# Patient Record
Sex: FEMALE | Race: WHITE | Employment: OTHER | URBAN - METROPOLITAN AREA
[De-identification: names, ages, dates, MRNs, and addresses within clinical notes are randomized per-mention and may not be internally consistent; named-entity substitution may affect disease eponyms.]

---

## 2017-04-26 ENCOUNTER — HOSPITAL ENCOUNTER (OUTPATIENT)
Dept: MAMMOGRAPHY | Age: 77
Discharge: HOME | End: 2017-04-26
Attending: SPECIALIST
Payer: MEDICARE

## 2017-04-26 DIAGNOSIS — Z12.31 VISIT FOR SCREENING MAMMOGRAM: ICD-10-CM

## 2017-04-26 PROCEDURE — 77067 SCR MAMMO BI INCL CAD: CPT

## 2018-04-30 ENCOUNTER — HOSPITAL ENCOUNTER (OUTPATIENT)
Dept: MAMMOGRAPHY | Age: 78
Discharge: HOME | End: 2018-04-30
Attending: SPECIALIST
Payer: MEDICARE

## 2018-04-30 DIAGNOSIS — Z12.31 VISIT FOR SCREENING MAMMOGRAM: ICD-10-CM

## 2018-04-30 PROCEDURE — 77063 BREAST TOMOSYNTHESIS BI: CPT

## 2019-05-01 ENCOUNTER — HOSPITAL ENCOUNTER (OUTPATIENT)
Dept: MAMMOGRAPHY | Age: 79
Discharge: HOME | End: 2019-05-01
Attending: OBSTETRICS & GYNECOLOGY
Payer: MEDICARE

## 2019-05-01 DIAGNOSIS — Z12.39 BREAST SCREENING, UNSPECIFIED: ICD-10-CM

## 2019-05-01 PROCEDURE — 77063 BREAST TOMOSYNTHESIS BI: CPT

## 2020-02-14 RX ORDER — CLOPIDOGREL BISULFATE 75 MG/1
75 TABLET ORAL DAILY
COMMUNITY
End: 2021-11-14

## 2020-02-14 RX ORDER — AMLODIPINE BESYLATE 5 MG/1
5 TABLET ORAL
COMMUNITY

## 2020-02-14 RX ORDER — ATENOLOL AND CHLORTHALIDONE TABLET 50; 25 MG/1; MG/1
1 TABLET ORAL
COMMUNITY

## 2020-02-14 RX ORDER — CHOLECALCIFEROL (VITAMIN D3) 125 MCG
400 CAPSULE ORAL DAILY
COMMUNITY

## 2020-02-14 RX ORDER — ROSUVASTATIN CALCIUM 5 MG/1
5 TABLET, COATED ORAL
COMMUNITY

## 2020-02-14 RX ORDER — ZOLPIDEM TARTRATE 5 MG/1
5 TABLET ORAL
COMMUNITY

## 2020-02-14 RX ORDER — PANTOPRAZOLE SODIUM 40 MG/1
40 TABLET, DELAYED RELEASE ORAL
COMMUNITY

## 2020-02-14 RX ORDER — CHOLECALCIFEROL (VITAMIN D3) 125 MCG
1 CAPSULE ORAL DAILY
COMMUNITY

## 2020-02-14 RX ORDER — CHOLECALCIFEROL (VITAMIN D3) 125 MCG
1 CAPSULE ORAL AS NEEDED
COMMUNITY

## 2020-02-14 RX ORDER — BISMUTH SUBSALICYLATE 262 MG
2 TABLET,CHEWABLE ORAL DAILY
COMMUNITY

## 2020-02-14 NOTE — PERIOP NOTES
[address] , [address] [facility]   MAIN OR                                  [phone]   MAIN PRE OP                          [phone]                                                                                AMBULATORY PRE OP          [phone]  PRE-ADMISSION TESTING    [phone]     Surgery Date:   Thursday, February 20, 2020      Is surgery arrival time given by surgeon? NO  If NO, [facility] staff will call you between 3 and 7pm the day before your surgery with your arrival time. (If your surgery is on a Monday, we will call you the Friday before.)    Call [phone] after 7pm Monday-Friday if you did not receive this call. INSTRUCTIONS BEFORE YOUR SURGERY   When You  Arrive Arrive at the [address] [facility] on the day of your surgery  Have your insurance card, photo ID, and any copayment (if needed)   Food   and   Drink NO food or drink after midnight the night before surgery    This means NO water, gum, mints, coffee, juice, etc.  No alcohol (beer, wine, liquor) 24 hours before and after surgery   Medications to   TAKE   Morning of Surgery MEDICATIONS TO TAKE THE MORNING OF SURGERY WITH A SIP OF WATER:    Amlodipine, Atenolol-Chlorthalidone   Medications  To  STOP      7 days before surgery  Non-Steroidal anti-inflammatory Drugs (NSAID's): for example, Ibuprofen (Advil, Motrin), Naproxen (Aleve)   Aspirin, if taking for pain    Herbal supplements, vitamins, and fish oil  (Pain medications not listed above, including Tylenol may be taken)   Blood  Thinners  Not Applicable   Bathing Clothing  Jewelry  Valuables      If you shower the morning of surgery, please do not apply anything to your skin (lotions, powders, deodorant, or makeup, especially mascara)   Follow Chlorhexidine Care Fusion body wash instructions provided to you during PAT appointment. Begin 3 days prior to surgery.    Do not shave or trim anywhere 24 hours before surgery   Wear your hair loose or down; no pony-tails, buns, or metal hair clips   Wear loose, comfortable, clean clothes   Wear glasses instead of contacts   Leave money, valuables, and jewelry, including body piercings, at home   Going Home - or Spending the Night  SAME-DAY SURGERY: You must have a responsible adult drive you home and stay with you 24 hours after surgery   ADMITS: If your doctor is keeping you in the hospital after surgery, leave personal belongings/luggage in your car until you have a hospital room number. Hospital discharge time is 12 noon  Drivers must be here before 12 noon unless you are told differently   Special Instructions Free  Parking at [address] Monday-Friday 7a-5p. Follow all instructions so your surgery wont be cancelled. Please, be on time. If a situation occurs and you are delayed the day of surgery, call [phone] or [phone]. If your physical condition changes (like a fever, cold, flu, etc.) call your surgeon. Home medication(s) reviewed and verified verbally with patient during PAT phone interview. The patient was contacted  via phone. The patient verbalizes understanding of all instructions and does not  need reinforcement.

## 2020-02-19 ENCOUNTER — ANESTHESIA EVENT (OUTPATIENT)
Dept: SURGERY | Age: 80
End: 2020-02-19
Payer: MEDICARE

## 2020-02-20 ENCOUNTER — HOSPITAL ENCOUNTER (OUTPATIENT)
Dept: GENERAL RADIOLOGY | Age: 80
Discharge: HOME | End: 2020-02-20
Attending: PODIATRIST

## 2020-02-20 ENCOUNTER — HOSPITAL ENCOUNTER (OUTPATIENT)
Age: 80
Setting detail: OUTPATIENT SURGERY
Discharge: HOME | End: 2020-02-20
Attending: PODIATRIST | Admitting: PODIATRIST
Payer: MEDICARE

## 2020-02-20 ENCOUNTER — ANESTHESIA (OUTPATIENT)
Dept: SURGERY | Age: 80
End: 2020-02-20
Payer: MEDICARE

## 2020-02-20 VITALS
HEART RATE: 65 BPM | RESPIRATION RATE: 16 BRPM | WEIGHT: 184 LBS | BODY MASS INDEX: 29.57 KG/M2 | TEMPERATURE: 97.4 F | SYSTOLIC BLOOD PRESSURE: 141 MMHG | DIASTOLIC BLOOD PRESSURE: 69 MMHG | OXYGEN SATURATION: 94 % | HEIGHT: 66 IN

## 2020-02-20 PROBLEM — M20.11 HALLUX VALGUS (ACQUIRED), RIGHT FOOT: Status: ACTIVE | Noted: 2020-02-20

## 2020-02-20 PROBLEM — M20.41 HAMMER TOE OF SECOND TOE OF RIGHT FOOT: Status: ACTIVE | Noted: 2020-02-20

## 2020-02-20 PROBLEM — M62.40 CONTRACTURE, TENDON SHEATH: Status: ACTIVE | Noted: 2020-02-20

## 2020-02-20 PROCEDURE — 77030018836 HC SOL IRR NACL ICUM -A: Performed by: PODIATRIST

## 2020-02-20 PROCEDURE — 77030000032 HC CUF TRNQT ZIMM -B: Performed by: PODIATRIST

## 2020-02-20 PROCEDURE — 77030021305 HC BLD TEAR RASP BRSM -B: Performed by: PODIATRIST

## 2020-02-20 PROCEDURE — 76210000021 HC REC RM PH II 0.5 TO 1 HR: Performed by: PODIATRIST

## 2020-02-20 PROCEDURE — 77030020268 HC MISC GENERAL SUPPLY: Performed by: PODIATRIST

## 2020-02-20 PROCEDURE — 77030040922 HC BLNKT HYPOTHRM STRY -A

## 2020-02-20 PROCEDURE — 77030003748: Performed by: PODIATRIST

## 2020-02-20 PROCEDURE — 74011250636 HC RX REV CODE- 250/636: Performed by: ANESTHESIOLOGY

## 2020-02-20 PROCEDURE — 77030002933 HC SUT MCRYL J&J -A: Performed by: PODIATRIST

## 2020-02-20 PROCEDURE — 77030040361 HC SLV COMPR DVT MDII -B

## 2020-02-20 PROCEDURE — 76010000131 HC OR TIME 2 TO 2.5 HR: Performed by: PODIATRIST

## 2020-02-20 PROCEDURE — 77030002916 HC SUT ETHLN J&J -A: Performed by: PODIATRIST

## 2020-02-20 PROCEDURE — 77030031139 HC SUT VCRL2 J&J -A: Performed by: PODIATRIST

## 2020-02-20 PROCEDURE — 74011000250 HC RX REV CODE- 250: Performed by: PODIATRIST

## 2020-02-20 PROCEDURE — C1769 GUIDE WIRE: HCPCS | Performed by: PODIATRIST

## 2020-02-20 PROCEDURE — 74011250636 HC RX REV CODE- 250/636: Performed by: PODIATRIST

## 2020-02-20 PROCEDURE — 77030006773 HC BLD SAW OSC BRSM -A: Performed by: PODIATRIST

## 2020-02-20 PROCEDURE — 77030002986 HC SUT PROL J&J -A: Performed by: PODIATRIST

## 2020-02-20 PROCEDURE — 76060000035 HC ANESTHESIA 2 TO 2.5 HR: Performed by: PODIATRIST

## 2020-02-20 PROCEDURE — C1713 ANCHOR/SCREW BN/BN,TIS/BN: HCPCS | Performed by: PODIATRIST

## 2020-02-20 PROCEDURE — 77030020269 HC MISC IMPL: Performed by: PODIATRIST

## 2020-02-20 PROCEDURE — 76210000006 HC OR PH I REC 0.5 TO 1 HR: Performed by: PODIATRIST

## 2020-02-20 DEVICE — SCREW BNE L26MM DIA2.5MM MIC FT ANK TI SELF DRL ST CANN: Type: IMPLANTABLE DEVICE | Site: FOOT | Status: FUNCTIONAL

## 2020-02-20 DEVICE — SCREW BNE L30MM DIA2.5MM MIC FT ANK TI SELF DRL ST CANN: Type: IMPLANTABLE DEVICE | Site: FOOT | Status: FUNCTIONAL

## 2020-02-20 RX ORDER — BUPIVACAINE HYDROCHLORIDE 5 MG/ML
INJECTION, SOLUTION EPIDURAL; INTRACAUDAL AS NEEDED
Status: DISCONTINUED | OUTPATIENT
Start: 2020-02-20 | End: 2020-02-20 | Stop reason: HOSPADM

## 2020-02-20 RX ORDER — MIDAZOLAM HYDROCHLORIDE 1 MG/ML
INJECTION, SOLUTION INTRAMUSCULAR; INTRAVENOUS AS NEEDED
Status: DISCONTINUED | OUTPATIENT
Start: 2020-02-20 | End: 2020-02-20 | Stop reason: HOSPADM

## 2020-02-20 RX ORDER — SODIUM CHLORIDE 0.9 % (FLUSH) 0.9 %
5-40 SYRINGE (ML) INJECTION EVERY 8 HOURS
Status: DISCONTINUED | OUTPATIENT
Start: 2020-02-20 | End: 2020-02-20 | Stop reason: HOSPADM

## 2020-02-20 RX ORDER — HYDROMORPHONE HYDROCHLORIDE 1 MG/ML
.25-1 INJECTION, SOLUTION INTRAMUSCULAR; INTRAVENOUS; SUBCUTANEOUS
Status: DISCONTINUED | OUTPATIENT
Start: 2020-02-20 | End: 2020-02-20 | Stop reason: HOSPADM

## 2020-02-20 RX ORDER — SODIUM CHLORIDE, SODIUM LACTATE, POTASSIUM CHLORIDE, CALCIUM CHLORIDE 600; 310; 30; 20 MG/100ML; MG/100ML; MG/100ML; MG/100ML
125 INJECTION, SOLUTION INTRAVENOUS CONTINUOUS
Status: DISCONTINUED | OUTPATIENT
Start: 2020-02-20 | End: 2020-02-20 | Stop reason: HOSPADM

## 2020-02-20 RX ORDER — LIDOCAINE HYDROCHLORIDE 10 MG/ML
INJECTION INFILTRATION; PERINEURAL AS NEEDED
Status: DISCONTINUED | OUTPATIENT
Start: 2020-02-20 | End: 2020-02-20 | Stop reason: HOSPADM

## 2020-02-20 RX ORDER — SODIUM CHLORIDE 0.9 % (FLUSH) 0.9 %
5-40 SYRINGE (ML) INJECTION AS NEEDED
Status: DISCONTINUED | OUTPATIENT
Start: 2020-02-20 | End: 2020-02-20 | Stop reason: HOSPADM

## 2020-02-20 RX ORDER — FENTANYL CITRATE 50 UG/ML
INJECTION, SOLUTION INTRAMUSCULAR; INTRAVENOUS AS NEEDED
Status: DISCONTINUED | OUTPATIENT
Start: 2020-02-20 | End: 2020-02-20 | Stop reason: HOSPADM

## 2020-02-20 RX ORDER — PROPOFOL 10 MG/ML
INJECTION, EMULSION INTRAVENOUS AS NEEDED
Status: DISCONTINUED | OUTPATIENT
Start: 2020-02-20 | End: 2020-02-20 | Stop reason: HOSPADM

## 2020-02-20 RX ORDER — PROPOFOL 10 MG/ML
INJECTION, EMULSION INTRAVENOUS
Status: DISCONTINUED | OUTPATIENT
Start: 2020-02-20 | End: 2020-02-20 | Stop reason: HOSPADM

## 2020-02-20 RX ORDER — SODIUM CHLORIDE, SODIUM LACTATE, POTASSIUM CHLORIDE, CALCIUM CHLORIDE 600; 310; 30; 20 MG/100ML; MG/100ML; MG/100ML; MG/100ML
100 INJECTION, SOLUTION INTRAVENOUS CONTINUOUS
Status: DISCONTINUED | OUTPATIENT
Start: 2020-02-20 | End: 2020-02-20 | Stop reason: HOSPADM

## 2020-02-20 RX ADMIN — FENTANYL CITRATE 50 MCG: 50 INJECTION, SOLUTION INTRAMUSCULAR; INTRAVENOUS at 07:43

## 2020-02-20 RX ADMIN — PROPOFOL 20 MG: 10 INJECTION, EMULSION INTRAVENOUS at 09:59

## 2020-02-20 RX ADMIN — FENTANYL CITRATE 25 MCG: 50 INJECTION, SOLUTION INTRAMUSCULAR; INTRAVENOUS at 09:34

## 2020-02-20 RX ADMIN — FENTANYL CITRATE 25 MCG: 50 INJECTION, SOLUTION INTRAMUSCULAR; INTRAVENOUS at 08:25

## 2020-02-20 RX ADMIN — WATER 2 G: 1 INJECTION INTRAMUSCULAR; INTRAVENOUS; SUBCUTANEOUS at 07:53

## 2020-02-20 RX ADMIN — MIDAZOLAM 2 MG: 1 INJECTION INTRAMUSCULAR; INTRAVENOUS at 07:43

## 2020-02-20 RX ADMIN — PROPOFOL 20 MG: 10 INJECTION, EMULSION INTRAVENOUS at 08:51

## 2020-02-20 RX ADMIN — FENTANYL CITRATE 25 MCG: 50 INJECTION, SOLUTION INTRAMUSCULAR; INTRAVENOUS at 08:13

## 2020-02-20 RX ADMIN — PROPOFOL 50 MG: 10 INJECTION, EMULSION INTRAVENOUS at 07:47

## 2020-02-20 RX ADMIN — FENTANYL CITRATE 25 MCG: 50 INJECTION, SOLUTION INTRAMUSCULAR; INTRAVENOUS at 08:47

## 2020-02-20 RX ADMIN — PROPOFOL 20 MG: 10 INJECTION, EMULSION INTRAVENOUS at 10:05

## 2020-02-20 RX ADMIN — PROPOFOL 50 MCG/KG/MIN: 10 INJECTION, EMULSION INTRAVENOUS at 07:48

## 2020-02-20 RX ADMIN — PROPOFOL 20 MG: 10 INJECTION, EMULSION INTRAVENOUS at 08:03

## 2020-02-20 RX ADMIN — SODIUM CHLORIDE, POTASSIUM CHLORIDE, SODIUM LACTATE AND CALCIUM CHLORIDE 125 ML/HR: 600; 310; 30; 20 INJECTION, SOLUTION INTRAVENOUS at 07:00

## 2020-02-20 RX ADMIN — FENTANYL CITRATE 25 MCG: 50 INJECTION, SOLUTION INTRAMUSCULAR; INTRAVENOUS at 08:59

## 2020-02-20 RX ADMIN — PROPOFOL 20 MG: 10 INJECTION, EMULSION INTRAVENOUS at 10:07

## 2020-02-20 RX ADMIN — FENTANYL CITRATE 25 MCG: 50 INJECTION, SOLUTION INTRAMUSCULAR; INTRAVENOUS at 09:19

## 2020-02-20 NOTE — ANESTHESIA PREPROCEDURE EVALUATION
Relevant Problems   No relevant active problems       Anesthetic History   No history of anesthetic complications            Review of Systems / Medical History  Patient summary reviewed, nursing notes reviewed and pertinent labs reviewed    Pulmonary  Within defined limits                 Neuro/Psych   Within defined limits      TIA     Cardiovascular  Within defined limits                     GI/Hepatic/Renal  Within defined limits   GERD           Endo/Other  Within defined limits      Arthritis     Other Findings              Physical Exam    Airway  Mallampati: II  TM Distance: 4 - 6 cm  Neck ROM: normal range of motion   Mouth opening: Normal     Cardiovascular    Rhythm: regular  Rate: normal         Dental  No notable dental hx       Pulmonary  Breath sounds clear to auscultation               Abdominal         Other Findings            Anesthetic Plan    ASA: 3  Anesthesia type: MAC            Anesthetic plan and risks discussed with: Patient

## 2020-02-20 NOTE — DISCHARGE INSTRUCTIONS
Post-Operative Instructions:    Patient Name: Juan A Llanes  Patient MRN: [id number]  : 1940  Discharged Date: 2020      MEDICATIONS:   -If you experience nausea, take anti-nausea medication   -Take pain medication regularly for first 48 hours. Then take as needed for pain.     -Often anti-nausea medication helps relieve pain due to it's mild sedative effect.   -Constipation can be a common side effect when taking narcotic pain medications, take precautions to avoid this:    -Drink plenty of fluids    -Eat plenty of fiber    -Avoid caffeine and dairy products    -Take stool softener if needed (Colace/Dulcolax)    DIET:   -Eat light foods for first meal today (ie: dry cereal/toast/crackers, clear fluids). -If tolerated first meal, then can eat normally at second meal.    ACTIVITY:   -Relative BED REST for first 72 hours (only getting to bathroom, bedroom, kitchen)   -Keep surgical shoe/boot on at all times (even when sleeping)   -Elevate surgical site at all times (at least 6 inches above hip level)   -Apply ice pack to just above surgical site (NOT directly on the site), 10 mins on and 20 mins off for the first 72 hours.   -Weight-bearing: PWB with full weight on right heel, surgical shoe, and patient has a walker. DRESSINGS/SHOWER:   -Keep dressing clean, dry, and intact at all times.   -Reinforce dressing as needed, but DO NOT remove dressing!! EMERGENCY:   -Call office ([phone]) or on all pager after hours ([phone]) if. ..    -bandages become wet or heavy drainage/bleeding noted    -medications are not helping your pain    -you injure or bump the surgical site    -you have fever over 101.5 degrees    FOLLOW-UP:   -Make sure you follow-up with your doctor within 1 week of surgery.    -Call office ([phone]) if you need to schedule appointment           Patients signature:  Date: 2020  Discharging Nurse:    Physician: Lilibeth Nelson., DPM            DISCHARGE SUMMARY from your Nurse    The following personal items collected during your admission are returned to you:   Dental Appliance: Dental Appliances: None  Vision: Visual Aid: Glasses  Hearing Aid:    Jewelry: Jewelry: None  Clothing: Clothing: Pants, Shirt, Socks, Sweater, Undergarments, Footwear(placed in locker)  Other Valuables: Other Valuables: None  Valuables sent to safe:      PATIENT INSTRUCTIONS:    After general anesthesia or intravenous sedation, for 24 hours or while taking prescription Narcotics:  · Limit your activities  · Do not drive and operate hazardous machinery  · Do not make important personal or business decisions  · Do  not drink alcoholic beverages  · If you have not urinated within 8 hours after discharge, please contact your surgeon on call. Report the following to your surgeon:  · Excessive pain, swelling, redness or odor of or around the surgical area  · Temperature over 100.5  · Nausea and vomiting lasting longer than 4 hours or if unable to take medications  · Any signs of decreased circulation or nerve impairment to extremity: change in color, persistent  numbness, tingling, coldness or increase pain  · Any questions    COUGH AND DEEP BREATHE    Breathing deep and coughing are very important exercises to do after surgery. Deep breathing and coughing open the little air tubes and air sacks in your lungs. You take deep breaths every day. You may not even notice - it is just something you do when you sigh or yawn. It is a natural exercise you do to keep these air passages open. After surgery, take deep breaths and cough, on purpose. Coughing and deep breathing help prevent bronchitis and pneumonia after surgery. If you had chest or belly surgery, use a pillow as a \"hug buddy\" and hold it tightly to your chest or belly when you cough. DIRECTIONS:  · Take 10 to 15 slow deep breaths every hour while awake. · Breathe in deeply, and hold it for 2 seconds.   · Exhale slowly through puckered lips, like blowing up a balloon. · After every 4th or 5th deep breath, hug your pillow to your chest or belly and give a hard, deep cough. Yes, it will probably hurt. But doing this exercise is very important part of healing after surgery. Take your pain medicine to help you do this exercise without too much pain. IF YOU HAVE BEEN DIAGNOSED WITH SLEEP APNEA, PLEASE USE YOUR SLEEP APNEA DEVICE OR CPAP MACHINE WHEN YOU INTEND TO NAP AFTER TAKING PAIN MEDICATION. Ankle Pumps    Ankle pumps increase the circulation of oxygenated blood to your lower extremities and decrease your risk for circulation problems such as blood clots. They also stretch the muscles, tendons and ligaments in your foot and ankle, and prevent joint contracture in the ankle and foot, especially after surgeries on the legs. It is important to do ankle pump exercises regularly after surgery because immobility increases your risk for developing a blood clot. Your doctor may also have you take an Aspirin for the next few days as well. If your doctor did not ask you to take an Aspirin, consult with him before starting Aspirin therapy on your own. Slowly point your foot forward, feeling the muscles on the top of your lower leg stretch, and hold this position for 5 seconds. Next, pull your foot back toward you as far as possible, stretching the calf muscles, and hold that position for 5 seconds. Repeat with the other foot. Perform 10 repetitions every hour while awake for both ankles if possible (down and then up with the foot once is one repetition). You should feel gentle stretching of the muscles in your lower leg when doing this exercise. If you feel pain, or your range of motion is limited, don't  Push too hard. Only go the limit your joint and muscles will let you go.   If you have increasing pain, progressively worsening leg warmth or swelling, STOP the exercise and call your doctor. Below is information about the medications your doctor is prescribing after your visit:    Other information in your discharge envelope:  []     PRESCRIPTIONS  []     SCHOOL/WORK NOTE  [x]     INFECTION PREVENTION  []     Yamelien 37  []     REGIONAL NERVE BLOCK ON QUE PAMPHLET   []     EXPAREL  []     HANDICAP APPLICATION         These are general instructions for a healthy lifestyle:    *  Please give a list of your current medications to your Primary Care Provider. *  Please update this list whenever your medications are discontinued, doses are      changed, or new medications (including over-the-counter products) are added. *  Please carry medication information at all times in case of emergency situations. About Smoking  No smoking / No tobacco products / Avoid exposure to second hand smoke    Surgeon General's Warning:  Quitting smoking now greatly reduces serious risk to your health. Obesity, smoking, and sedentary lifestyle greatly increases your risk for illness and disease. A healthy diet, regular physical exercise & weight monitoring are important for maintaining a healthy lifestyle. Congestive Heart Failure  You may be retaining fluid if you have a history of heart failure or if you experience any of the following symptoms:  Weight gain of 3 pounds or more overnight or 5 pounds in a week, increased swelling in our hands or feet or shortness of breath while lying flat in bed. Please call your doctor as soon as you notice any of these symptoms; do not wait until your next office visit. Recognize signs and symptoms of STROKE:  F - face looks uneven  A - arms unable to move or move even  S - speech slurred or non-existent  T - time-call 911 as soon as signs and symptoms begin-DO NOT go         Back to bed or wait to see if you get better-TIME IS BRAIN. Warning signs of HEART ATTACK  Call 911 if you have these symptoms    · Chest discomfort.   Most heart attacks involve discomfort in the center of the chest that lasts more than a few minutes, or that goes away and comes back. It can feel like uncomfortable pressure, squeezing, fullness, or pain. · Discomfort in other areas of the upper body. Symptoms can include pain or discomfort in one or both        Arms, the back, neck, jaw, or stomach. ·  Shortness of breath with or without chest discomfort. · Other signs may include breaking out in a cold sweat, nausea, or lightheadedness    Don't wait more than five minutes to call 911 - MINUTES MATTER! Fast action can save your life. Calling 911 is almost always the fastest way to get lifesaving treatment. Emergency Medical Services staff can begin treatment when they arrive - up to an hour sooner than if someone gets to the hospital by car. [facility] MEDICATION AND SIDE EFFECT GUIDE    The [facility] MEDICATION AND SIDE EFFECT GUIDE was provided to the PATIENT AND CARE PROVIDER.   Information provided includes instruction about drug purpose and common side effects for the following medications:    · Take prescriptions exactly as directed by Dr. Michoacano Ruggiero

## 2020-02-20 NOTE — ANESTHESIA POSTPROCEDURE EVALUATION
Procedure(s):  HALLUX VALGUS CORRECTION FIRST METATARSAL POSSIBLE FIRST TOE OSTEOTOMY, SCREW FIXATION, SECOND HAMMERTOE CORRECTION WITH OSTEOTOMY, TENOTOMY AND CAPSULOTOMY OF SECOND METATARSAL PHALANGEAL JOINT RIGHT FOOT. No value filed. Anesthesia Post Evaluation      Multimodal analgesia: multimodal analgesia not used between 6 hours prior to anesthesia start to PACU discharge  Patient location during evaluation: PACU  Patient participation: complete - patient participated  Level of consciousness: sleepy but conscious, awake and responsive to verbal stimuli  Pain score: 0  Pain management: adequate  Airway patency: patent  Anesthetic complications: no  Cardiovascular status: hemodynamically stable and acceptable  Respiratory status: acceptable  Hydration status: acceptable  Comments: Patient seen and evaluated; no concerns. Post anesthesia nausea and vomiting:  none      Vitals Value Taken Time   /69 2/20/2020 10:35 AM   Temp 36.3 °C (97.4 °F) 2/20/2020 10:15 AM   Pulse 54 2/20/2020 10:38 AM   Resp 14 2/20/2020 10:38 AM   SpO2 100 % 2/20/2020 10:38 AM   Vitals shown include unvalidated device data.

## 2020-02-20 NOTE — BRIEF OP NOTE
BRIEF OPERATIVE NOTE    Date of Procedure: 2/20/2020   Preoperative Diagnosis: HALLUX VALGUS, SECOND HAMMERTOE, CONTRACTED EXTENSOR TENDON AND CAPSULE SECOND METATARSAL PHALANGEAL JOINT RIGHT FOOT  Postoperative Diagnosis:  HALLUX VALGUS, SECOND HAMMERTOE, EXTENSOR TENDON AND CAPSULE SECOND METATARSAL PHALANGEAL JOINT RIGHT FOOT    Procedure(s):  HALLUX VALGUS CORRECTION FIRST METATARSAL OSTEOTOMY, SCREW FIXATION, SECOND HAMMERTOE CORRECTION WITH OSTEOTOMY, TENOTOMY AND CAPSULOTOMY OF SECOND METATARSAL PHALANGEAL JOINT RIGHT FOOT  Surgeon(s) and Role:     * Myla Rees DPM - Primary         Surgical Assistant: NONE. Surgical Staff:  Circ-1: Honorio Blanco RN  Circ-Relief: Leon Cope RN  Scrub Tech-1: Ventura Kaye  Surg Asst-1: MayJacquelin  Event Time In Time Out   Incision Start 0040    Incision Close 1008      Anesthesia: MAC   Estimated Blood Loss: Less than 5 mL  Specimens: * No specimens in log *   Findings: Hypertrophic bone, digital contracture 2nd, right. Complications: None. Implants:   Implant Name Type Inv.  Item Serial No.  Lot No. LRB No. Used Action   CytRx   [id number]  [id number] Right 1 Implanted   SCR BNE COMP FT GERMAINE 2.5X26MM --  - SNA  SCR BNE COMP FT GERMAINE 2.5X26MM --  NA ARTHREX NA Right 1 Implanted   SCR BNE COMP FT GERMAINE 2.5X30MM --  - SNA  SCR BNE COMP FT GERMAINE 2.5X30MM --  NA ARTHREX NA Right 1 Implanted

## 2020-02-21 NOTE — OP NOTES
[facility]  OPERATIVE REPORT    Name:  Jovanni Rodas  MR#:  [id number]  :  1940  ACCOUNT #:  [de-identified]  DATE OF SERVICE:  2020    PREOPERATIVE DIAGNOSES:  1. 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 2.  Hammertoe deformity of the second digit, right foot. 3.  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 POSTOPERATIVE DIAGNOSES:  1. 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 2.  Hammertoe deformity of the second digit, right foot. 3.  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 PROCEDURE PERFORMED:  1. Hallux valgus correction with first metatarsal osteotomy and screw fixation, right foot. 2.  Hammertoe correction, second digit with osteotomy, right foot. 3.  Tenotomy and capsulotomy, second metatarsophalangeal joint, right foot. SURGEON:  Taras Chen DPM    ANESTHESIOLOGIST:  Dr. Kimani Mckeon. ASSISTANT:  None. ANESTHESIA:  MAC.    COMPLICATIONS:  *None. SPECIMENS REMOVED:  None. IMPLANTS:  Two 2.5-mm fully threaded Arthrex compression screws and also the Vivex amniotic soft tissue graft, 3 cm x 4 cm. ESTIMATED BLOOD LOSS:  Less than 5 mL. INDICATIONS:  The patient is a 45-year-old white female, who presented to the office with chronically painful bunion deformity, hammertoe deformity and contracted second metatarsophalangeal joint on the right foot. The patient had all treatment options reviewed with her from conservative to surgical.  The patient was made aware of all risks, benefits, alternatives and potential complications of surgical management including but not limited to need for additional surgery, failure of the procedure to achieve desired results, over or under correction, swelling, stiffness, scarring, numbness, nerve damage, delayed union, malunion or nonunion which was reviewed and that she would be a particular risk given her advanced age.   The patient was also made aware of delayed healing of the soft tissue, potential of infection of the soft tissue and/or the bone. The patient was made aware of potentially prolonged postoperative period as well as possible prolonged and painful postoperative period as well as a chronic pain syndrome. The patient had the consent reviewed, understood and signed, and elected for surgical management of this condition. PREPARATION AND PROCEDURE:  The patient was taken to the operating room, placed on the operating room table in supine position. The patient had a well-padded pneumatic midcalf tourniquet applied to the right calf. The patient had received 2 g of Ancef for preoperative prophylactic antibiotics. The patient had the right foot prepped and draped in normal sterile fashion. The pneumatic midcalf tourniquet was elevated to 300 mmHg and procedure began. The patient did receive 18 mL of 1% lidocaine infiltrated into the surgical site. Procedure #1 is hallux valgus correction with first metatarsal osteotomy and screw fixation. Attention was directed towards the medial aspect of the first metatarsophalangeal joint where a 1.5-cm incision was made along the course of a prominent first metatarsal head with palpable bursa and exostosis. The incision was deepened down to the level of the subcutaneous tissue, taking care to appropriately retract and ligate all vascular structures as necessary. Then, though both blunt and sharp dissection, the incision was deepened down to the level of the bursa which was identified and carefully resected in toto. Site was copiously flushed and irrigated. Dissection was then taken down to the level of the periosteum and joint capsule of the medial aspect of the first metatarsophalangeal joint. Then, a linear capsular and periosteal incision was made. The soft tissues were elevated with the use of a [place] elevator and then a #15 blade and the Brown-Blaise forceps.   There was noted be significant hypertrophic bone of the medial aspect of the first metatarsal.  This was resected in toto with the use of a rongeur, then smoothed with a reciprocating rasp. Site was copiously washed and irrigated. Next, using the Arthrex MIS platform, the side-cutting heidi from the Arthrex set was brought to the medial aspect of the juncture of the metatarsal just proximal to the medial aspect of the first metatarsal head. Under intraoperative C-arm guidance, following the technique guide from Arthrex strictly for the minimal incision surgery, the side-cutting heidi was utilized to perform a transverse osteotomy cut. Then, the capital fragment osteotomy was translated laterally following the reduction technique with a curved hemostat. Then, a 0.062 wire was utilized to maintain the fixation in a laterally positioned capital fragment osteotomy. Then following strict AO technique, two 2.5-mm fully-threaded Arthrex compression screws were brought across the osteotomy site from proximal medial to distal lateral.  Intraoperative C-arm confirmed excellent purchase of bone for the fixation with no extension to adjacent joint spaces. The capital fragment osteotomy was plantarflexed to compensate for forefoot varus. Site was copiously flushed and irrigated. There was noted to be adequate reduction without need for an Joseph osteotomy. PROCEDURE #2, hammertoe correction of the second digit on the right foot. Attention was directed towards the second toe of the right foot where there was noted to be a digital contracture at the proximal interphalangeal joint, dorsiflexion of the proximal phalanx and plantarflexion of the middle phalanx.   With the use of a [place] blade, a small stab incision was made at the medial aspect of the proximal phalanx and then a second incision was made to the medial aspect of the middle phalanx and through both blunt and sharp dissection, incision was deepened down to the level of subcutaneous tissue, taking care to appropriately retract and ligate all neurovascular structures as necessary. Then through both blunt and sharp dissection, the periosteum of the proximal phalanx was identified and then a periosteal incision was made at each incision with the use of a #15 blade. The periosteal tissue was elevated at each incision with the use of a [place] elevator. Then using the small 2 x 12-mm side-cutting heidi from the Arthrex set, two osteotomy cuts were made into the proximal and then the middle phalanx sequentially. There was found to be adequate reduction of the toe deformity with a residual contracture at the second metatarsophalangeal joint. Procedure #3 is tenotomy and capsulotomy of the second metatarsophalangeal joint. Attention was directed towards the dorsal aspect of the second metatarsophalangeal joint where a small stab incision was made. The extensor tendon was identified and transversely tenotomized. Then, the dissection was deepened down to the level of the joint capsule which then was released as well in a transverse fashion. The toe was completely reduced at this stage. Site was copiously flushed and irrigated. At the surgery sites, bone wax was applied to the osteotomy cuts at the sites of bleeding bone. Then, deep closure was obtained with the use of 3-0 Vicryl at the periosteal and capsular level. Then, the site was copiously flushed and irrigated at each surgery site. Then, the subcutaneous tissue was closed with the use of 3-0 Monocryl in interrupted simple suture fashion and the skin was closed with the use of 4-0 nylon in interrupted horizontal mattress suture fashion. The patient received 20 mL of 0.5% Marcaine for postoperative analgesia. The incisions were dressed with Xeroform, 4x4, Esdras and Ace bandage. The pneumatic ankle tourniquet had been deflated and noted that the capillary filling time was immediate to all digits of the right foot.   The patient will be followed up as an outpatient.       Colby Herrera [id number]  D:  02/20/2020 10:50  T:  02/20/2020 22:50  JOB #:  [id number]

## 2020-05-29 ENCOUNTER — HOSPITAL ENCOUNTER (OUTPATIENT)
Dept: MAMMOGRAPHY | Age: 80
Discharge: HOME | End: 2020-05-29
Attending: SPECIALIST
Payer: MEDICARE

## 2020-05-29 DIAGNOSIS — Z12.31 BREAST CANCER SCREENING BY MAMMOGRAM: ICD-10-CM

## 2020-05-29 PROCEDURE — 77067 SCR MAMMO BI INCL CAD: CPT

## 2021-05-03 ENCOUNTER — TRANSCRIBE ORDER (OUTPATIENT)
Dept: SCHEDULING | Age: 81
End: 2021-05-03

## 2021-05-03 DIAGNOSIS — Z12.31 SCREENING MAMMOGRAM FOR HIGH-RISK PATIENT: Primary | ICD-10-CM

## 2021-06-21 ENCOUNTER — HOSPITAL ENCOUNTER (OUTPATIENT)
Dept: MAMMOGRAPHY | Age: 81
Discharge: HOME | End: 2021-06-21
Attending: INTERNAL MEDICINE
Payer: MEDICARE

## 2021-06-21 DIAGNOSIS — Z12.31 SCREENING MAMMOGRAM FOR HIGH-RISK PATIENT: ICD-10-CM

## 2021-06-21 PROCEDURE — 77067 SCR MAMMO BI INCL CAD: CPT

## 2021-11-14 ENCOUNTER — HOSPITAL ENCOUNTER (EMERGENCY)
Age: 81
Discharge: HOME | End: 2021-11-14
Attending: FAMILY MEDICINE
Payer: MEDICARE

## 2021-11-14 VITALS
WEIGHT: 174 LBS | BODY MASS INDEX: 27.97 KG/M2 | DIASTOLIC BLOOD PRESSURE: 86 MMHG | HEIGHT: 66 IN | RESPIRATION RATE: 18 BRPM | TEMPERATURE: 97.8 F | OXYGEN SATURATION: 96 % | SYSTOLIC BLOOD PRESSURE: 184 MMHG | HEART RATE: 83 BPM

## 2021-11-14 DIAGNOSIS — M62.838 TRAPEZIUS MUSCLE SPASM: ICD-10-CM

## 2021-11-14 DIAGNOSIS — R04.0 EPISTAXIS: Primary | ICD-10-CM

## 2021-11-14 PROCEDURE — 99282 EMERGENCY DEPT VISIT SF MDM: CPT

## 2021-11-14 NOTE — ED TRIAGE NOTES
Patient reports nose bleed that lasted for 30 min today, reports woke her up has been having HA intermittent for past 3 months

## 2021-11-16 NOTE — ED PROVIDER NOTES
EMERGENCY DEPARTMENT HISTORY AND PHYSICAL EXAM      Date: 11/14/2021  Patient Name: Kiara Thorpe    History of Presenting Illness     Chief complaint: Bloody nose  History Provided By:     HPI: Kiara Thorpe, is an extremely pleasant 80 y.o. female presenting to the ED with a chief complaint of bloody nose. Patient endorses a bloody nose that she could not get to stop prior to arrival.  She is chronically anticoagulated on Xarelto. She has been taking her medications as prescribed. Upon arrival to the emergency department there has been no further bleeding. She complains only of tenderness in the muscles between her neck and left shoulder. She denies any other bleeding. No easy bruising. She states she otherwise feels well. No fevers, chills or rigors. There are no other complaints, changes, or physical findings at this time. PCP: Patrice Hunt MD    No current facility-administered medications on file prior to encounter. Current Outpatient Medications on File Prior to Encounter   Medication Sig Dispense Refill    rivaroxaban (Xarelto) 10 mg tablet Take 10 mg by mouth daily.  pantoprazole (PROTONIX) 40 mg tablet Take 40 mg by mouth nightly.  rosuvastatin (CRESTOR) 5 mg tablet Take 5 mg by mouth every Monday, Wednesday, Friday.  zolpidem (AMBIEN) 5 mg tablet Take 5 mg by mouth nightly as needed for Sleep.  atenoloL-chlorthalidone (TENORETIC) 50-25 mg per tablet Take 1 Tab by mouth every morning.  amLODIPine (NORVASC) 5 mg tablet Take 5 mg by mouth every morning.  co-enzyme Q-10 (CO Q-10) 100 mg capsule Take 400 mg by mouth daily.  cholecalciferol, vitamin D3, (VITAMIN D3) 50 mcg (2,000 unit) tab Take 1 Tab by mouth daily.  multivitamin (ONE A DAY) tablet Take 2 Tabs by mouth daily.  naproxen sodium (ALEVE) 220 mg cap Take 1 Tab by mouth as needed.          Past History     Past Medical History:  Past Medical History:   Diagnosis Date    Arthritis     Right Hand    Breast cancer ([facility] [address] 1989    br ca    Fractured elbow 1949    Left    GERD (gastroesophageal reflux disease) 2018    Stroke ([facility] [address] 2010    TIA        Past Surgical History:  Past Surgical History:   Procedure Laterality Date    HX BREAST LUMPECTOMY Right 1989    br ca    HX BUNIONECTOMY Left 2005    HX CHOLECYSTECTOMY  2010    HX CORONARY STENT PLACEMENT  2005    HX KNEE REPLACEMENT Left 2002    HX KNEE REPLACEMENT Right 2008    HX WISDOM TEETH EXTRACTION  teenager       Family History:  Family History   Problem Relation Age of Onset    Breast Cancer Maternal Grandmother         age unknown    Breast Cancer Paternal Grandmother         age unknown    Breast Cancer Maternal Aunt 44       Social History:  Social History     Tobacco Use    Smoking status: Never Smoker    Smokeless tobacco: Never Used   Substance Use Topics    Alcohol use: Not Currently    Drug use: Never       Allergies:  No Known Allergies      Review of Systems     Review of Systems   Constitutional: Negative for activity change, appetite change, chills, fatigue and fever. HENT: Negative for congestion and sore throat. Bloody nose   Eyes: Negative for photophobia and visual disturbance. Respiratory: Negative for cough, shortness of breath and wheezing. Cardiovascular: Negative for chest pain, palpitations and leg swelling. Gastrointestinal: Negative for abdominal pain, diarrhea, nausea and vomiting. Endocrine: Negative for cold intolerance and heat intolerance. Musculoskeletal: Negative for gait problem and joint swelling. Pain in muscles between neck and left shoulder   Skin: Negative for color change and rash. Neurological: Negative for dizziness and headaches. Physical Exam     Physical Exam  Constitutional:       Appearance: She is well-developed. HENT:      Head: Normocephalic and atraumatic.       Nose:      Comments: Dried blood in bilateral nares, no   bleeding Mouth/Throat:      Mouth: Mucous membranes are moist.      Pharynx: Oropharynx is clear. Eyes:      Conjunctiva/sclera: Conjunctivae normal.      Pupils: Pupils are equal, round, and reactive to light. Cardiovascular:      Rate and Rhythm: Normal rate and regular rhythm. Heart sounds: No murmur heard. Pulmonary:      Effort: No respiratory distress. Breath sounds: No stridor. No wheezing, rhonchi or rales. Abdominal:      General: There is no distension. Tenderness: There is no abdominal tenderness. There is no rebound. Musculoskeletal:      Cervical back: Normal range of motion and neck supple. Comments: Tenderness to palpation in the left trapezius muscle. No midline C-spine tenderness or stiffness   Skin:     General: Skin is warm and dry. Neurological:      General: No focal deficit present. Mental Status: She is alert and oriented to person, place, and time. Psychiatric:         Mood and Affect: Mood normal.         Behavior: Behavior normal.         Lab and Diagnostic Study Results     Labs -   No results found for this or any previous visit (from the past 12 hour(s)). Radiologic Studies -   @lastxrresult@  CT Results  (Last 48 hours)    None        CXR Results  (Last 48 hours)    None            Medical Decision Making   - I am the first provider for this patient. - I reviewed the vital signs, available nursing notes, past medical history, past surgical history, family history and social history. - Initial assessment performed. The patients presenting problems have been discussed, and they are in agreement with the care plan formulated and outlined with them. I have encouraged them to ask questions as they arise throughout their visit. Vital Signs-Reviewed the patient's vital signs. No data found. ED Course/ Provider Notes (Medical Decision Making):     Patient presented to the emergency department with a chief complaint of bloody nose.   On examination the patient is nontoxic and well-appearing. She is afebrile. Vitals were reviewed per above. Bleeding had stopped spontaneously in the emergency department she was monitored with no return of bleeding. We discussed supportive measures for her symptoms. Also noted to have tenderness of left trapezius muscle. Possible spasm. Discussed supportive measures for her likely trapezius muscle spasm. Dona Carpenter was given a thorough list of signs and symptoms that would warrant an immediate return to the emergency department. Otherwise Dona Carpenter will follow up with PCP. Procedures   Medical Decision Makingedical Decision Making  Performed by: Jaqueline Craft, DO  Procedures  None       Disposition   Disposition:     Home    All of the diagnostic tests were reviewed and questions answered. Diagnosis, care plan and treatment options were discussed. The patient understands the instructions and will follow up as directed. The patients results have been reviewed with them. They have been counseled regarding their diagnosis. The patient verbally convey understanding and agreement of the signs, symptoms, diagnosis, treatment and prognosis and additionally agrees to follow up as recommended with their PCP in 24 - 48 hours. They also agree with the care-plan and convey that all of their questions have been answered. I have also put together some discharge instructions for them that include: 1) educational information regarding their diagnosis, 2) how to care for their diagnosis at home, as well a 3) list of reasons why they would want to return to the ED prior to their follow-up appointment, should their condition change. DISCHARGE PLAN:    1. Cannot display discharge medications since this patient is not currently admitted.         2.   Follow-up Information     Follow up With Specialties Details Why Contact Gloria Tatum MD Internal Medicine   [address] [place] [address] [address] [facility]  [phone]              3.  Return to ED if worse       4. Discharge Medication List as of 11/14/2021  4:52 PM            Diagnosis     Clinical Impression:    1. Epistaxis    2. Trapezius muscle spasm        Attestations:    Dorina Arshad, DO    Please note that this dictation was completed with [employer], the computer voice recognition software. Quite often unanticipated grammatical, syntax, homophones, and other interpretive errors are inadvertently transcribed by the computer software. Please disregard these errors. Please excuse any errors that have escaped final proofreading. Thank you.

## 2022-03-18 PROBLEM — M20.11 HALLUX VALGUS (ACQUIRED), RIGHT FOOT: Status: ACTIVE | Noted: 2020-02-20

## 2022-03-19 PROBLEM — M62.40 CONTRACTURE, TENDON SHEATH: Status: ACTIVE | Noted: 2020-02-20

## 2022-03-19 PROBLEM — M20.41 HAMMER TOE OF SECOND TOE OF RIGHT FOOT: Status: ACTIVE | Noted: 2020-02-20

## 2022-05-26 ENCOUNTER — TRANSCRIBE ORDER (OUTPATIENT)
Dept: SCHEDULING | Age: 82
End: 2022-05-26

## 2022-05-26 DIAGNOSIS — Z12.31 VISIT FOR SCREENING MAMMOGRAM: Primary | ICD-10-CM

## 2022-06-22 ENCOUNTER — HOSPITAL ENCOUNTER (OUTPATIENT)
Dept: MAMMOGRAPHY | Age: 82
Discharge: HOME | End: 2022-06-22
Attending: SPECIALIST
Payer: MEDICARE

## 2022-06-22 DIAGNOSIS — Z12.31 VISIT FOR SCREENING MAMMOGRAM: ICD-10-CM

## 2022-06-22 PROCEDURE — 77067 SCR MAMMO BI INCL CAD: CPT

## 2023-05-31 ENCOUNTER — TRANSCRIBE ORDERS (OUTPATIENT)
Facility: HOSPITAL | Age: 83
End: 2023-05-31

## 2023-05-31 DIAGNOSIS — Z12.31 VISIT FOR SCREENING MAMMOGRAM: Primary | ICD-10-CM

## 2023-07-18 ENCOUNTER — HOSPITAL ENCOUNTER (OUTPATIENT)
Facility: HOSPITAL | Age: 83
Discharge: HOME | End: 2023-07-21
Payer: MEDICARE

## 2023-07-18 DIAGNOSIS — Z12.31 VISIT FOR SCREENING MAMMOGRAM: ICD-10-CM

## 2023-07-18 PROCEDURE — 77063 BREAST TOMOSYNTHESIS BI: CPT

## 2024-05-29 ENCOUNTER — TRANSCRIBE ORDERS (OUTPATIENT)
Dept: SCHEDULING | Age: 84
End: 2024-05-29

## 2024-05-29 DIAGNOSIS — Z12.31 VISIT FOR SCREENING MAMMOGRAM: Primary | ICD-10-CM

## 2024-07-24 ENCOUNTER — HOSPITAL ENCOUNTER (OUTPATIENT)
Facility: HOSPITAL | Age: 84
Discharge: HOME | End: 2024-07-27
Payer: MEDICARE

## 2024-07-24 VITALS — BODY MASS INDEX: 28.68 KG/M2 | HEIGHT: 64 IN | WEIGHT: 168 LBS

## 2024-07-24 DIAGNOSIS — Z12.31 VISIT FOR SCREENING MAMMOGRAM: ICD-10-CM

## 2024-07-24 PROCEDURE — 77063 BREAST TOMOSYNTHESIS BI: CPT

## 2025-07-28 ENCOUNTER — HOSPITAL ENCOUNTER (OUTPATIENT)
Facility: HOSPITAL | Age: 85
Discharge: HOME | End: 2025-07-31
Payer: MEDICARE

## 2025-07-28 VITALS — WEIGHT: 149 LBS | BODY MASS INDEX: 25.44 KG/M2 | HEIGHT: 64 IN

## 2025-07-28 DIAGNOSIS — Z12.31 VISIT FOR SCREENING MAMMOGRAM: ICD-10-CM

## 2025-07-28 PROCEDURE — 77063 BREAST TOMOSYNTHESIS BI: CPT

## (undated) DEVICE — SURGICAL PROCEDURE PACK BASIN MAJ SET CUST NO CAUT

## (undated) DEVICE — IRRIGATION TUBING

## (undated) DEVICE — RASP SURG W5.5XL11MM REPL RECIP SM TEAR CRSS CUT STRYKR FOR

## (undated) DEVICE — STERILE POLYISOPRENE POWDER-FREE SURGICAL GLOVES: Brand: PROTEXIS

## (undated) DEVICE — TUBING SUCT 10FR MAL ALUM SHFT FN CAP VENT UNIV CONN W/ OBT

## (undated) DEVICE — BANDAGE COMPR 9 FTX4 IN SMOOTH COMFORTABLE SYNTH ESMRK LF

## (undated) DEVICE — PACK,BASIC,SIRUS,V: Brand: MEDLINE

## (undated) DEVICE — DEVICE TRNSF SPIK STL 2008S] MICROTEK MEDICAL INC]

## (undated) DEVICE — ZIMMER® STERILE DISPOSABLE TOURNIQUET CUFF WITH PROTECTIVE SLEEVE AND PLC, DUAL PORT, SINGLE BLADDER, 18 IN. (46 CM)

## (undated) DEVICE — GUIDEWIRE ORTH DIA0.034IN W/ TRCR TIP LSR MRK DISP FOR MIC

## (undated) DEVICE — COVER LT HNDL PLAS RIG 1 PER PK

## (undated) DEVICE — STERILE POLYISOPRENE POWDER-FREE SURGICAL GLOVES WITH EMOLLIENT COATING: Brand: PROTEXIS

## (undated) DEVICE — SUTURE MCRYL SZ 4-0 L27IN ABSRB UD L19MM PS-2 1/2 CIR PRIM Y426H

## (undated) DEVICE — SUTURE VCRL SZ 4-0 L27IN ABSRB VLT L26MM SH 1/2 CIR J315H

## (undated) DEVICE — SYR 3ML LL TIP 1/10ML GRAD --

## (undated) DEVICE — DRAPE,EXTREMITY,89X128,STERILE: Brand: MEDLINE

## (undated) DEVICE — DRESSING,GAUZE,XEROFORM,CURAD,1"X8",ST: Brand: CURAD

## (undated) DEVICE — 1010 S-DRAPE TOWEL DRAPE 10/BX: Brand: STERI-DRAPE™

## (undated) DEVICE — NEEDLE HYPO 18GA L1.5IN PNK S STL HUB POLYPR SHLD REG BVL

## (undated) DEVICE — SUTURE MCRYL SZ 3-0 L27IN ABSRB UD L26MM SH 1/2 CIR Y416H

## (undated) DEVICE — TOWEL SURG W17XL27IN STD BLU COT NONFENESTRATED PREWASHED

## (undated) DEVICE — BANDAGE,GAUZE,CONFORMING,3"X75",STRL,LF: Brand: MEDLINE

## (undated) DEVICE — (D)PREP SKN CHLRAPRP APPL 26ML -- CONVERT TO ITEM 371833

## (undated) DEVICE — Device

## (undated) DEVICE — SUT ETHLN 4-0 18IN PS2 BLK --

## (undated) DEVICE — INTENDED FOR TISSUE SEPARATION, AND OTHER PROCEDURES THAT REQUIRE A SHARP SURGICAL BLADE TO PUNCTURE OR CUT.: Brand: BARD-PARKER ® CARBON RIB-BACK BLADES

## (undated) DEVICE — SOLUTION IV 1000ML 0.9% SOD CHL

## (undated) DEVICE — SUT PROL 4-0 18IN P3 BLU --

## (undated) DEVICE — SPONGE GZ W4XL4IN COT 12 PLY TYP VII WVN C FLD DSGN

## (undated) DEVICE — BLADE SAW W5.5XL25MM THK0.38MM CUT THK0.43MM REPL S STL SAG

## (undated) DEVICE — STRAP,POSITIONING,KNEE/BODY,FOAM,4X60": Brand: MEDLINE

## (undated) DEVICE — NEEDLE HYPO 25GA L1.5IN BVL ORIENTED ECLIPSE

## (undated) DEVICE — ROCKER SWITCH PENCIL BLADE ELECTRODE, HOLSTER: Brand: EDGE

## (undated) DEVICE — SYR 10ML LUER LOK 1/5ML GRAD --